# Patient Record
Sex: FEMALE | Race: WHITE
[De-identification: names, ages, dates, MRNs, and addresses within clinical notes are randomized per-mention and may not be internally consistent; named-entity substitution may affect disease eponyms.]

---

## 2018-01-31 ENCOUNTER — HOSPITAL ENCOUNTER (OUTPATIENT)
Dept: HOSPITAL 96 - M.CRD | Age: 68
End: 2018-01-31
Attending: INTERNAL MEDICINE
Payer: MEDICARE

## 2018-01-31 DIAGNOSIS — I25.10: ICD-10-CM

## 2018-01-31 DIAGNOSIS — I47.2: Primary | ICD-10-CM

## 2018-01-31 NOTE — EXE
[place] [address]
Phone:  [phone]                     STRESS ECHOCARDIOGRAM         
_______________________________________________________________________________
 
Name:         JAROCHO GARZA               Room:                     [facility]#:    [id number]     Account #:     [id number]  
Admission:    01/31/18    Attend Phys:   Alan Villareal, 
Discharge:                Date of Birth: [birth year]  
Date of Service: 01/31/18 1423  Report #:      [id number]
        [id number]
_______________________________________________________________________________
THIS REPORT FOR:  //name//                      
 
 
--------------- APPROVED REPORT --------------
 
 
Exam:  Stress Echocardiogram
Indication: Chest pain
Patient Location: Out-Patient
Stress Nurse: Jackie Barksdale RN
Supervising Physician: Rivera Martinez MD
Status: routine
 
Ht: 5 ft 1 in      
HR: 58 bpm       BP: 176/81 mmHg 
 
Medical History
Medical History: CAD
Cardiac Risk Factors: Hyperlipidemia, FHX of CAD
 
Procedure
The patient underwent an Exercise Stress Test using the Joe 
Protocol. Blood pressure, heart rate, and EKG were monitored.
An Echocardiogram was performed by technician in four stages in quad 
fashion.  At peak stress, four selected images were obtained and 
placed side by side with resting images for comparison.
 
Stress Test Details
Stress Test:  Exercise stress testing was performed using a Joe 
protocol.      
HR           
Resting HR:            58 bpm   Max Heart Rate (APMHR): 155 bpm  
Max HR Achieved:  146 bpm   Target HR (85% APMHR): 131 bpm  
% of APMHR:         94         
Recovery HR:            81 bpm        
HR response to stress: Normal HR response to stress      
 
BP           
Resting BP:  176/81 mmHg        
Max BP:       186/69 mmHg        
Recovery BP:       140/82 mmHg        
 
ECG           
Resting ECG:  RBBB         
Stress ECG:     Sinus Tachycardia       
ST Change: Horizontal ST depression       
 
 
[place] [address]
Phone:  [phone]                     STRESS ECHOCARDIOGRAM         
_______________________________________________________________________________
 
Name:         JAROCHO GARZA               Room:                     [facility]
ELIZABETH#:    [id number]     Account #:     [id number]  
Admission:    01/31/18    Attend Phys:   Alan Villareal, 
Discharge:                Date of Birth: [birth year]  
Date of Service: 01/31/18 1423  Report #:      [id number]
        [id number]
_______________________________________________________________________________
Maximum ST Deviation: 1.5 mm        
Arrhythmia:    VPC's         
Recovery ECG: Sinus Rhythm        
Recovery ST Change: Horizontal ST depression      
Recovery ST Deviation: 1 mm        
 
Clinical
Reason for Termination: Dyspnea
Exercise duration: 7 min 35 sec
Highest Stage Achieved: Stage 2: 2.5 mph at 12% grade. 
Exercise capacity: 9.49 METs
 
Pre-Stress Echo
The resting Echocardiogram showed normal left ventricular 
contractility with an estimated Ejection Fraction of about 55-60%. 
 
Post-Stress Echo
The stress Echocardiogram showed normal left ventricular 
contractility with an estimated Ejection Fraction of about 65-70%. 
 
Conclusion
Clinical Response:  Non-ischemic
Exercise Capacity:  Average
Stress ECG Response:  Ischemic
Stress Echo Images:  Non-ischemic
low risk stress echo for future cardiac events
 
Other Information
Study Quality: Fair
 
&lt;Conclusion&gt;
low risk stress echo for future cardiac events
 
 
 
 
 
 
 
 
 
 
 
 
  <ELECTRONICALLY SIGNED>
                                           By: Rivera Martinez MD, FAC      
  01/31/18     1423
D: 01/31/18 1423   _____________________________________
T: 01/31/18 1423   Rivera Martinez MD, [facility]        /INF

## 2020-10-11 ENCOUNTER — HOSPITAL ENCOUNTER (OUTPATIENT)
Dept: HOSPITAL 96 - M.ERS | Age: 70
Setting detail: OBSERVATION
LOS: 1 days | Discharge: HOME | End: 2020-10-12
Attending: INTERNAL MEDICINE | Admitting: INTERNAL MEDICINE
Payer: MEDICARE

## 2020-10-11 VITALS — SYSTOLIC BLOOD PRESSURE: 131 MMHG | DIASTOLIC BLOOD PRESSURE: 72 MMHG

## 2020-10-11 VITALS — WEIGHT: 157 LBS | BODY MASS INDEX: 29.64 KG/M2 | HEIGHT: 60.98 IN

## 2020-10-11 VITALS — SYSTOLIC BLOOD PRESSURE: 168 MMHG | DIASTOLIC BLOOD PRESSURE: 75 MMHG

## 2020-10-11 VITALS — SYSTOLIC BLOOD PRESSURE: 100 MMHG | DIASTOLIC BLOOD PRESSURE: 69 MMHG

## 2020-10-11 VITALS — DIASTOLIC BLOOD PRESSURE: 59 MMHG | SYSTOLIC BLOOD PRESSURE: 183 MMHG

## 2020-10-11 DIAGNOSIS — G47.33: ICD-10-CM

## 2020-10-11 DIAGNOSIS — I10: ICD-10-CM

## 2020-10-11 DIAGNOSIS — R07.89: Primary | ICD-10-CM

## 2020-10-11 DIAGNOSIS — R00.2: ICD-10-CM

## 2020-10-11 DIAGNOSIS — Z79.899: ICD-10-CM

## 2020-10-11 DIAGNOSIS — E78.5: ICD-10-CM

## 2020-10-11 DIAGNOSIS — Z20.828: ICD-10-CM

## 2020-10-11 DIAGNOSIS — E03.9: ICD-10-CM

## 2020-10-11 DIAGNOSIS — I25.10: ICD-10-CM

## 2020-10-11 DIAGNOSIS — Z79.82: ICD-10-CM

## 2020-10-11 DIAGNOSIS — J45.909: ICD-10-CM

## 2020-10-11 LAB
ABSOLUTE BASOPHILS: 0.1 THOU/UL (ref 0–0.2)
ABSOLUTE EOSINOPHILS: 0.4 THOU/UL (ref 0–0.7)
ABSOLUTE MONOCYTES: 0.6 THOU/UL (ref 0–1.2)
ALBUMIN SERPL-MCNC: 3.1 G/DL (ref 3.4–5)
ALP SERPL-CCNC: 109 U/L (ref 46–116)
ALT SERPL-CCNC: 69 U/L (ref 30–65)
ANION GAP SERPL CALC-SCNC: 4 MMOL/L (ref 7–16)
AST SERPL-CCNC: 30 U/L (ref 15–37)
BASOPHILS NFR BLD AUTO: 1.2 %
BILIRUB SERPL-MCNC: 0.2 MG/DL
BUN SERPL-MCNC: 19 MG/DL (ref 7–18)
CALCIUM SERPL-MCNC: 9.2 MG/DL (ref 8.5–10.1)
CHLORIDE SERPL-SCNC: 103 MMOL/L (ref 98–107)
CO2 SERPL-SCNC: 33 MMOL/L (ref 21–32)
CREAT SERPL-MCNC: 1.1 MG/DL (ref 0.6–1.3)
EOSINOPHIL NFR BLD: 7 %
GLUCOSE SERPL-MCNC: 123 MG/DL (ref 70–99)
GRANULOCYTES NFR BLD MANUAL: 47.9 %
HCT VFR BLD CALC: 39 % (ref 37–47)
HGB BLD-MCNC: 13.3 GM/DL (ref 12–15)
INR PPP: 0.9
LYMPHOCYTES # BLD: 1.9 THOU/UL (ref 0.8–5.3)
LYMPHOCYTES NFR BLD AUTO: 33.6 %
MCH RBC QN AUTO: 32.7 PG (ref 26–34)
MCHC RBC AUTO-ENTMCNC: 34 G/DL (ref 28–37)
MCV RBC: 96.2 FL (ref 80–100)
MONOCYTES NFR BLD: 10.3 %
MPV: 9 FL. (ref 7.2–11.1)
NEUTROPHILS # BLD: 2.7 THOU/UL (ref 1.6–8.1)
NT-PRO BRAIN NAT PEPTIDE: 795 PG/ML (ref ?–300)
NUCLEATED RBCS: 0 /100WBC
PLATELET COUNT*: 277 THOU/UL (ref 150–400)
POTASSIUM SERPL-SCNC: 3.3 MMOL/L (ref 3.5–5.1)
PROT SERPL-MCNC: 6.9 G/DL (ref 6.4–8.2)
PROTHROMBIN TIME: 9.1 SECONDS (ref 9.2–11.5)
RBC # BLD AUTO: 4.06 MIL/UL (ref 4.2–5)
RDW-CV: 13.3 % (ref 10.5–14.5)
SODIUM SERPL-SCNC: 140 MMOL/L (ref 136–145)
WBC # BLD AUTO: 5.7 THOU/UL (ref 4–11)

## 2020-10-12 VITALS — SYSTOLIC BLOOD PRESSURE: 165 MMHG | DIASTOLIC BLOOD PRESSURE: 78 MMHG

## 2020-10-12 VITALS — SYSTOLIC BLOOD PRESSURE: 139 MMHG | DIASTOLIC BLOOD PRESSURE: 73 MMHG

## 2020-10-12 VITALS — DIASTOLIC BLOOD PRESSURE: 68 MMHG | SYSTOLIC BLOOD PRESSURE: 170 MMHG

## 2020-10-12 VITALS — SYSTOLIC BLOOD PRESSURE: 134 MMHG | DIASTOLIC BLOOD PRESSURE: 64 MMHG

## 2020-10-12 VITALS — SYSTOLIC BLOOD PRESSURE: 111 MMHG | DIASTOLIC BLOOD PRESSURE: 46 MMHG

## 2020-10-12 LAB
ABSOLUTE BASOPHILS: 0.1 THOU/UL (ref 0–0.2)
ABSOLUTE EOSINOPHILS: 0.5 THOU/UL (ref 0–0.7)
ABSOLUTE MONOCYTES: 0.6 THOU/UL (ref 0–1.2)
ANION GAP SERPL CALC-SCNC: 3 MMOL/L (ref 7–16)
BASOPHILS NFR BLD AUTO: 0.8 %
BUN SERPL-MCNC: 16 MG/DL (ref 7–18)
CALCIUM SERPL-MCNC: 9.4 MG/DL (ref 8.5–10.1)
CHLORIDE SERPL-SCNC: 103 MMOL/L (ref 98–107)
CO2 SERPL-SCNC: 33 MMOL/L (ref 21–32)
CREAT SERPL-MCNC: 1 MG/DL (ref 0.6–1.3)
EOSINOPHIL NFR BLD: 7.3 %
GLUCOSE SERPL-MCNC: 94 MG/DL (ref 70–99)
GRANULOCYTES NFR BLD MANUAL: 29.2 %
HCT VFR BLD CALC: 36.6 % (ref 37–47)
HGB BLD-MCNC: 12.4 GM/DL (ref 12–15)
LYMPHOCYTES # BLD: 3.5 THOU/UL (ref 0.8–5.3)
LYMPHOCYTES NFR BLD AUTO: 54 %
MCH RBC QN AUTO: 32.4 PG (ref 26–34)
MCHC RBC AUTO-ENTMCNC: 33.8 G/DL (ref 28–37)
MCV RBC: 95.9 FL (ref 80–100)
MONOCYTES NFR BLD: 8.7 %
MPV: 8.8 FL. (ref 7.2–11.1)
NEUTROPHILS # BLD: 1.9 THOU/UL (ref 1.6–8.1)
NUCLEATED RBCS: 0 /100WBC
PLATELET COUNT*: 249 THOU/UL (ref 150–400)
POTASSIUM SERPL-SCNC: 4.2 MMOL/L (ref 3.5–5.1)
RBC # BLD AUTO: 3.81 MIL/UL (ref 4.2–5)
RDW-CV: 13.8 % (ref 10.5–14.5)
SODIUM SERPL-SCNC: 139 MMOL/L (ref 136–145)
WBC # BLD AUTO: 6.5 THOU/UL (ref 4–11)

## 2020-10-12 NOTE — CARDNUC
[place] [address]
Phone:  [phone]                     CARDIAC NUCLEAR IMAGING REPORT
_______________________________________________________________________________
 
Name:         GREGJAROCHO DIA               Room:          [address]
M.R.#:    [id number]     Account #:     [id number]  
Admission:    10/11/20    Attend Phys:   John Monteiro, 
Discharge:                Date of Birth: [birth year]  
Date of Service: 10/12/20 1809  Report #:      [id number]
        [id number] 
_______________________________________________________________________________
THIS REPORT FOR:
 
cc:  Pablo Adam MD, Matthew W. MD Liston, Michael J. MD [facility]     
                                                                       ~
 
--------------- APPROVED REPORT --------------
 
 
Imaging Protocol: Rest Tc-99m/Stress Tc-99m 1 day
Study performed:  10/12/2020 15:09:07
 
Indication: Chest pressure/Discomfort, Palpitations, Ventricular 
Trigeminy.
Patient Location: In-Patient
Room #: 223
Stress Tech: Pat Dixon
Stress Nurse: Maya Connor RN
NM Tech:MAX Lay
 
Ht: 5 ft 0 in  Wt: 157 lbs  BSA:  1.68 m2
    BMI:  30.65
 
Medical History
Medical History: Chest pressure/discomfort, Heart 
palpitations/irregular HR, Ventricular Trigeminy, HEENA, Hypothyroidism,
 HTN, HLD, CAD s/p MI, CAD s/p stent.
Medications: Hydralazine, Mag-Ox, K-Dur, Home meds include ASA 81 Mg, 
Chlorthalidone, Lorsartan, Metoprolol, NTG, Repatha.
Allergies: Atenolol, Codeine, Lovastatin.
Cardiac Risk Factors: Age, FHX of CAD, HTN, Hyperlipidemia, 
Palpitations, V-Trigeminy.
Previous Cardiac Procedures: Myocardial infarction, PCI.
Pretest Chest Pain Characteristics: No chest pain
Exercise History: Indeterminate
Physical Disabilities: Weakness.
Meds Held (24 hrs): None
 
Resting Data
Rest SPECT myocardial perfusion imaging was performed in supine 
position 30 minutes following the intravenous injection of 10.5 mCi 
of Tc-99m Sestamibi.
Time of rest injection: 1310     Date: 10/12/2020
The images were gated to evaluate regional wall motion and calculate 
left ventricular ejection fraction. 
 
 
[place] [address]
Phone:  [phone]                     CARDIAC NUCLEAR IMAGING REPORT
_______________________________________________________________________________
 
Name:         JAROCHO GARZA               Room:          [address]
ELIZABETH#:    [id number]     Account #:     [id number]  
Admission:    10/11/20    Attend Phys:   John Monteiro, 
Discharge:                Date of Birth: [birth year]  
Date of Service: 10/12/20 1809  Report #:      [id number]
        [id number] 
_______________________________________________________________________________
Administration Route: IV
Administration Site: Left Arm
 
Pharmacologic Stress
Pharmacologic stress test was performed by injecting Regadenoson 0.4 
mg IV push over 10-15 seconds immediately followed by the intravenous 
injection of 31.2 mCi of Tc-99m Sestamibi.
Time of stress injection: 1500     Date: 10/12/2020
Administration Route: IV
Administration Site: Left Arm
Gated Stress SPECT was performed 40 minutes after stress 
injection.
The images were gated to evaluate regional wall motion and calculate 
left ventricular ejection fraction. 
Prone imaging was performed.
 
Stress Test Details
Stress Test:  Pharmacologic stress testing performed using 0.4 mg of 
regadenoson per 5 mL given IV over 10 seconds.      
  Reason for pharmacologic stress test: Weakness..      
 
HR      Max Heart Rate (APMHR): 152 bpm  
Resting HR:            61 bpm   Target HR (85% APMHR): 129 bpm  
Max HR Achieved:  104 bpm        
% of APMHR:         68         
Recovery HR:            79 bpm        
 
BP           
Resting BP:  113/77 mmHg        
Max BP:       153/74 mmHg        
Recovery BP:       160/88 mmHg        
ECG           
Resting ECG:  Sinus Rhythm, RBBB       
Stress ECG:     Sinus Tachycardia, RBBB       
ST Change: None          
Arrhythmia:    VPC's         
Recovery ECG: Sinus Rhythm, RBBB       
Recovery ST Change: None        
Recovery Arrhythmia: VPC        
 
Clinical
Reason for Termination: Completed protocol
Stress Symptoms: Cramping pain under left breast 2/10, SOA, 
Headache.
Exercise duration: 00 min 00 sec
Exercise capacity: 1.00 METs
 
 
[place] [address]
Phone:  [phone]                     CARDIAC NUCLEAR IMAGING REPORT
_______________________________________________________________________________
 
Name:         JAROCHO GARZA               Room:          [address]
M.Gui#:    [id number]     Account #:     [id number]  
Admission:    10/11/20    Attend Phys:   John Monteiro, 
Discharge:                Date of Birth: [birth year]  
Date of Service: 10/12/20 1809  Report #:      [id number]
        [id number] 
_______________________________________________________________________________
The patient tolerated walking Lexiscan protocol without significant 
cardiac symptoms.
 
Nurse Comments
A 70 year old female inpatient presented for a sitting Lexiscan r/t 
tachycardia, palpitations, Ventricular Trigeminy, chest pressure. 
Test well tolerated. Recovery unremarkable. Patient was stable and 
stated she felt good when transported via wheelchair to [facility] for imaging.
 
Stress ECG Conclusion
The baseline twelve-lead EKG shows sinus rhythm with right bundle 
branch block.  EKGs obtained during and post walking Lexiscan 
protocol show sinus rhythm and sinus tachycardia without significant 
ST segment changes.  There were occasional unifocal premature 
ventricular contractions noted.
 
Study Quality
Study: Good
Artifact: No artifact 
 
Study Data
At rest, the left ventricular ejection fraction was 81%..   
Post stress, the left ventricular ejection was 83%..   
TID = 0.90.       
 
Perfusion
Perfusion images obtained at rest and post walking Lexiscan stress 
showed uniform uptake of the radioisotope throughout the myocardium.  
There were no defects to suggest infarct or ischemia.
 
Wall Motion
Normal left ventricular wall motion.
 
Nuclear Conclusion
ECG Findings: negative for ischemia 
Clinical Findings: negative for ischemia 
Nuclear Findings: negative for ischemia 
Exercise Capacity: not assessed
Left Ventricular Function: normal 
Risk Study: low
Myocardial perfusion images show no defect to suggest infarct or 
ischemia.  Left ventricular systolic function is normal on gated 
studies.  This is a low risk study. 
 
<Conclusion>
 
 
[place] [address]
Phone:  [phone]                     CARDIAC NUCLEAR IMAGING REPORT
_______________________________________________________________________________
 
Name:         JAROCHO GARZA               Room:          [address] Luigi JOHNSON#:    [id number]     Account #:     [id number]  
Admission:    10/11/20    Attend Phys:   John Monteiro, 
Discharge:                Date of Birth: [birth year]  
Date of Service: 10/12/20 1809  Report #:      [id number]
        [id number] 
_______________________________________________________________________________
The baseline twelve-lead EKG shows sinus rhythm with right bundle 
branch block.  EKGs obtained during and post walking Lexiscan 
protocol show sinus rhythm and sinus tachycardia without significant 
ST segment changes.  There were occasional unifocal premature 
ventricular contractions noted.
 
 
 
 
 
 
 
 
 
 
 
 
 
 
 
 
 
 
 
 
 
 
 
 
 
 
 
 
 
 
 
 
 
 
 
 
 
 
 
  <ELECTRONICALLY SIGNED>
                                           By: Martir So MD, FACC   
  10/12/20     1809
D: 10/12/20 1809   _____________________________________
T: 10/12/20 1809   Martir So MD, FACC     /INF

## 2020-10-12 NOTE — EKG
[place] [address]
Phone:  [phone]                     ELECTROCARDIOGRAM REPORT      
_______________________________________________________________________________
 
Name:         GREGRADHALYNN DIA               Room:          [address]
M.R.#:    [id number]     Account #:     [id number]  
Admission:    10/11/20    Attend Phys:   John Monteiro, 
Discharge:                Date of Birth: [birth year]  
Date of Service: 10/11/20 1551  Report #:      [id number]
        [id number]
_______________________________________________________________________________
THIS REPORT FOR:  //name//                      
 
                         [facility] ED
                                       
Test Date:    2020-10-11               Test Time:    15:51:40
Pat Name:     JAROCHO GARZA            Department:   
Patient ID:   [id number]            Room:         [facility]
Gender:       F                        Technician:   
:          1950               Requested By: Suzie Coats
Order Number: [id number] MD:   Martir So
                                 Measurements
Intervals                              Axis          
Rate:         82                       P:            17
KY:           191                      QRS:          -59
QRSD:         149                      T:            36
QT:           420                                    
QTc:          491                                    
                           Interpretive Statements
Sinus rhythm
Ventricular trigeminy
RBBB and LAFB
Compared to ECG 2014 07:34:41
Ventricular premature complex(es) now present
Bifascicular block no longer present
Myocardial infarct finding no longer present
Electronically Signed On 10- 18:26:12 CDT by Martir So
[email or web address]
 
 
 
 
 
 
 
 
 
 
 
 
 
 
 
 
 
  <ELECTRONICALLY SIGNED>
                                           By: Martir So MD, FACC   
  10/12/20     1826
D: 10/11/20 1551   _____________________________________
T: 10/11/20 1551   Martir So MD, FACC     /EPI

## 2021-01-03 ENCOUNTER — HOSPITAL ENCOUNTER (EMERGENCY)
Dept: HOSPITAL 96 - M.ERS | Age: 71
Discharge: HOME | End: 2021-01-03
Payer: MEDICARE

## 2021-01-03 VITALS — DIASTOLIC BLOOD PRESSURE: 68 MMHG | SYSTOLIC BLOOD PRESSURE: 113 MMHG

## 2021-01-03 VITALS — BODY MASS INDEX: 30.21 KG/M2 | HEIGHT: 61 IN | WEIGHT: 160.01 LBS

## 2021-01-03 DIAGNOSIS — J45.901: Primary | ICD-10-CM

## 2021-01-03 DIAGNOSIS — G47.33: ICD-10-CM

## 2021-01-03 DIAGNOSIS — I25.10: ICD-10-CM

## 2021-01-03 DIAGNOSIS — I10: ICD-10-CM

## 2021-01-03 DIAGNOSIS — E03.9: ICD-10-CM

## 2021-01-03 DIAGNOSIS — E78.5: ICD-10-CM

## 2021-01-03 DIAGNOSIS — Z88.8: ICD-10-CM

## 2021-01-03 DIAGNOSIS — Z20.828: ICD-10-CM

## 2021-01-03 DIAGNOSIS — Z88.5: ICD-10-CM

## 2021-01-04 NOTE — EKG
[place] [address]
Phone:  [phone]                     ELECTROCARDIOGRAM REPORT      
_______________________________________________________________________________
 
[facility]:         [facility]AROCHO GARZA               [id number]     Account #:     [id number]  
Admission:    21    Attend Phys:                     
Discharge:    21    Date of Birth: [birth year]  
Date of Service: 21 1444  Report #:      [id number]
        [id number]
_______________________________________________________________________________
THIS REPORT FOR:  //name//                      
 
                         [facility] ED
                                       
Test Date:    2021               Test Time:    14:44:51
Pat Name:     JAROCHO GARZA            Department:   
Patient ID:   [id number]            Room:          
Gender:       F                        Technician:   CCD
:          1950               Requested By: Sandhya Jorge
Order Number: [id number] MD:   Rivera Martinez
                                 Measurements
Intervals                              Axis          
Rate:         77                       P:            30
AK:           180                      QRS:          -27
QRSD:         128                      T:            70
QT:           413                                    
QTc:          468                                    
                           Interpretive Statements
Sinus rhythm
Right bundle branch block
Compared to ECG 10/11/2020 15:51:40
Ventricular premature complex(es) no longer present
Left anterior fascicular block no longer present
Electronically Signed On 2021 13:46:23 CST by Rivera Martinez
[email or web address]
 
 
 
 
 
 
 
 
 
 
 
 
 
 
 
 
 
 
 
  <ELECTRONICALLY SIGNED>
                                           By: Rivera Martinez MD, [facility]      
  21     1346
D: 21 1444   _____________________________________
T: 21 1444   Rivera Martinez MD, [facility]        /EPI